# Patient Record
Sex: FEMALE | Race: BLACK OR AFRICAN AMERICAN | URBAN - METROPOLITAN AREA
[De-identification: names, ages, dates, MRNs, and addresses within clinical notes are randomized per-mention and may not be internally consistent; named-entity substitution may affect disease eponyms.]

---

## 2020-06-02 ENCOUNTER — HOSPITAL ENCOUNTER (OUTPATIENT)
Dept: OTHER | Facility: HOSPITAL | Age: 85
Discharge: HOME | End: 2020-06-02
Attending: FAMILY MEDICINE

## 2020-06-02 LAB — SARS-COV-2 RNA SPEC QL NAA+PROBE: DETECTED

## 2020-06-03 ENCOUNTER — HOSPITAL ENCOUNTER (OUTPATIENT)
Dept: OTHER | Facility: HOSPITAL | Age: 85
Discharge: HOME | End: 2020-06-03
Attending: FAMILY MEDICINE

## 2020-06-04 ENCOUNTER — TELEMEDICINE CONVERTED (OUTPATIENT)
Dept: CARDIOLOGY | Facility: CLINIC | Age: 85
End: 2020-06-04
Attending: INTERNAL MEDICINE

## 2020-06-04 LAB — SARS-COV-2 RNA SPEC QL NAA+PROBE: NOT DETECTED

## 2021-05-13 NOTE — PROGRESS NOTES
Progress Note      Patient Name: Tita Valdes   Patient ID: [id number]   Sex: Female   YOB: 1935        Visit Date: June 4, 2020    Provider: Ronald Fowler MD   Location: [facility]   Location Address: [address] [place] [address], [facility]   TEO Reynoso  [id number]   Location Phone: [phone]          History Of Present Illness  TELEHEALTH TELEPHONE VISIT  Chief Complaint: Paroxysmal atrial fibrillation   Tita Valdes is an 84 year old female who has a previous history of diastolic congestive heart failure, hypertension, paroxysmal atrial fibrillation, and chronic renal failure. History was taken per the nurse taking care of Ms. Valdes, as patient has baseline dementia. The nurse states that the patient has not been experiencing any recent problems or issues today, but has had, last week, some increased shortness of breath, which was concerning for pneumonia. She has been placed on prophylactic antibiotics and has been doing well otherwise. Patient is poorly communicative due to baseline dementia issues. She is presenting for evaluation via telehealth telephone visit. Verbal consent obtained before beginning visit.   Provider spent 5 minutes with the patient during the telehealth visit.   The following staff were present during this visit: Provider only.   PAST MEDICAL HISTORY: Atrial fibrillation/flutter; Chronic renal insufficiency; Diabetes mellitus; Diastolic congestive heart failure; Hypertension.   FAMILY HISTORY: Positive for diabetes mellitus.   PSYCHOSOCIAL HISTORY: Denies alcohol use. Previously smoked, but quit in the 1990's.   CURRENT MEDICATIONS: Acetaminophen 325 mg p.r.n.; Cardizem cd 240 mg daily; Eliquis 2.5 mg b.i.d.; ferrous sulfate 325 mg daily; furosemide 20 mg p.r.n.; glucagon emergency kit 1 mg; glucose gel 40%; GlycoLax powder; Humalog; Levemir; metoprolol succinate ER 50 mg b.i.d.; Voltaren gel daily p.r.n. Dosage and frequency of the  medications reviewed with the patient.       Review of Systems  · Cardiovascular  o Admits  o : shortness of breath while walking or lying flat  o Denies  o : palpitations (fast, fluttering, or skipping beats), swelling (feet, ankles, hands), chest pain or angina pectoris   · Respiratory  o Denies  o : chronic or frequent cough, asthma or wheezing      Vitals     Blood pressure not able to be obtained.           Assessment     ASSESSMENT & PLAN:    1.  Diastolic congestive heart failure.  Patient, symptom-wise, sounds stable.  Continue with her current        medications.    2.  Atrial fibrillation, chronic.  Patient on Eliquis for CVA prevention.  Will plan on checking an EKG on followup        visit.             Electronically Signed by: Stephanie Vazquez-, Other -Author on June 11, 2020 07:11:14 AM  Electronically Co-signed by: Ronald Fowler MD -Reviewer on June 18, 2020 05:03:31 PM